# Patient Record
Sex: FEMALE | Race: BLACK OR AFRICAN AMERICAN | NOT HISPANIC OR LATINO | Employment: FULL TIME | URBAN - METROPOLITAN AREA
[De-identification: names, ages, dates, MRNs, and addresses within clinical notes are randomized per-mention and may not be internally consistent; named-entity substitution may affect disease eponyms.]

---

## 2018-01-31 ENCOUNTER — OFFICE VISIT (OUTPATIENT)
Dept: OBSTETRICS AND GYNECOLOGY | Facility: CLINIC | Age: 23
End: 2018-01-31
Payer: MEDICAID

## 2018-01-31 VITALS
DIASTOLIC BLOOD PRESSURE: 70 MMHG | HEIGHT: 64 IN | WEIGHT: 194.88 LBS | BODY MASS INDEX: 33.27 KG/M2 | SYSTOLIC BLOOD PRESSURE: 126 MMHG

## 2018-01-31 DIAGNOSIS — Z30.09 ENCOUNTER FOR COUNSELING REGARDING CONTRACEPTION: Primary | ICD-10-CM

## 2018-01-31 PROCEDURE — 99999 PR PBB SHADOW E&M-EST. PATIENT-LVL II: CPT | Mod: PBBFAC,,, | Performed by: ADVANCED PRACTICE MIDWIFE

## 2018-01-31 PROCEDURE — 3008F BODY MASS INDEX DOCD: CPT | Mod: ,,, | Performed by: ADVANCED PRACTICE MIDWIFE

## 2018-01-31 PROCEDURE — 99203 OFFICE O/P NEW LOW 30 MIN: CPT | Mod: S$PBB,,, | Performed by: ADVANCED PRACTICE MIDWIFE

## 2018-01-31 PROCEDURE — 99212 OFFICE O/P EST SF 10 MIN: CPT | Mod: PBBFAC,PO | Performed by: ADVANCED PRACTICE MIDWIFE

## 2018-01-31 NOTE — PROGRESS NOTES
"Subjective:       Ashleigh Honeycutt is a 22 y.o. female here for an infertility consult. Pt wants to get pregnant because she is lonely. She is not in a relationship or . She reports having a good job, her own home and car. Lots of family support.     Gynecologic History  No LMP recorded (lmp unknown). Patient is not currently having periods (Reason: Birth Control).  Menarche: 17, prior to being on depo, she had regular cycles  Contraception: Depo Started at age 18 after pregnancy (elective ). She was on this for 3 years. Last shot was in October. Never had cycles on the shot.  Sexually active: men. 1 partner in the last year. Using condoms inconsistently.  STD: none  OB: one  (D&C) at age 18    Obstetric History  OB History    Para Term  AB Living   1       1     SAB TAB Ectopic Multiple Live Births                  # Outcome Date GA Lbr Van/2nd Weight Sex Delivery Anes PTL Lv   1 AB                 The following portions of the patient's history were reviewed and updated as appropriate: allergies, current medications, past family history, past medical history, past social history, past surgical history and problem list.    Review of Systems  Constitutional: negative  Cardiovascular: negative  Gastrointestinal: negative  Genitourinary:negative  Integument/breast: negative  Hematologic/lymphatic: negative      Objective:      /70   Ht 5' 4" (1.626 m)   Wt 88.4 kg (194 lb 14.2 oz)   LMP  (LMP Unknown)   BMI 33.45 kg/m²    Talk only (pt declines exam today)  General appearance: alert, appears stated age and cooperative  Lungs: normal respiratory rate  Heart: regular rate  Extremities: extremities normal, atraumatic, no cyanosis or edema      Assessment:      Healthy female exam.      Plan:         Discussed that one of the side effects of Depo is that we are unsure when ovulation will ensue. Discussed mapping cycles once they begin to determine which days she is most " "fertile.  Recommend daily PNV.   Recommended persistent condom use to avoid STD exposure.  Reviewed the realities of pregnancy and  including discomforts, delivery, costs, infant costs, day cares, etc. Pt affirms that she did not yet consider the cons of being young and single with a . She also reports that she watched her sister give birth and it was "scary."     "

## 2019-04-19 ENCOUNTER — HOSPITAL ENCOUNTER (EMERGENCY)
Facility: HOSPITAL | Age: 24
Discharge: HOME | End: 2019-04-19
Attending: EMERGENCY MEDICINE
Payer: MEDICAID

## 2019-04-19 ENCOUNTER — NURSE TRIAGE (OUTPATIENT)
Dept: ADMINISTRATIVE | Facility: CLINIC | Age: 24
End: 2019-04-19

## 2019-04-19 VITALS
RESPIRATION RATE: 18 BRPM | OXYGEN SATURATION: 100 % | BODY MASS INDEX: 36.32 KG/M2 | WEIGHT: 205 LBS | TEMPERATURE: 99 F | DIASTOLIC BLOOD PRESSURE: 70 MMHG | SYSTOLIC BLOOD PRESSURE: 120 MMHG | HEART RATE: 83 BPM | HEIGHT: 63 IN

## 2019-04-19 DIAGNOSIS — O20.0 THREATENED MISCARRIAGE: Primary | ICD-10-CM

## 2019-04-19 LAB
ABO + RH BLD: NORMAL
ALBUMIN SERPL BCP-MCNC: 3.4 G/DL (ref 3.5–5.2)
ALP SERPL-CCNC: 74 U/L (ref 55–135)
ALT SERPL W/O P-5'-P-CCNC: 6 U/L (ref 10–44)
AMORPH CRY URNS QL MICRO: NORMAL
ANION GAP SERPL CALC-SCNC: 10 MMOL/L (ref 8–16)
AST SERPL-CCNC: 15 U/L (ref 10–40)
BACTERIA #/AREA URNS HPF: NORMAL /HPF
BACTERIA GENITAL QL WET PREP: ABNORMAL
BASOPHILS # BLD AUTO: 0.02 K/UL (ref 0–0.2)
BASOPHILS NFR BLD: 0.3 % (ref 0–1.9)
BILIRUB SERPL-MCNC: 0.2 MG/DL (ref 0.1–1)
BILIRUB UR QL STRIP: NEGATIVE
BUN SERPL-MCNC: 7 MG/DL (ref 6–20)
CALCIUM SERPL-MCNC: 9.9 MG/DL (ref 8.7–10.5)
CHLORIDE SERPL-SCNC: 105 MMOL/L (ref 95–110)
CLARITY UR: ABNORMAL
CLUE CELLS VAG QL WET PREP: ABNORMAL
CO2 SERPL-SCNC: 21 MMOL/L (ref 23–29)
COLOR UR: YELLOW
CREAT SERPL-MCNC: 0.7 MG/DL (ref 0.5–1.4)
DIFFERENTIAL METHOD: ABNORMAL
EOSINOPHIL # BLD AUTO: 0.1 K/UL (ref 0–0.5)
EOSINOPHIL NFR BLD: 0.8 % (ref 0–8)
ERYTHROCYTE [DISTWIDTH] IN BLOOD BY AUTOMATED COUNT: 14.1 % (ref 11.5–14.5)
EST. GFR  (AFRICAN AMERICAN): >60 ML/MIN/1.73 M^2
EST. GFR  (NON AFRICAN AMERICAN): >60 ML/MIN/1.73 M^2
FILAMENT FUNGI VAG WET PREP-#/AREA: ABNORMAL
GLUCOSE SERPL-MCNC: 83 MG/DL (ref 70–110)
GLUCOSE UR QL STRIP: NEGATIVE
HCG INTACT+B SERPL-ACNC: NORMAL MIU/ML
HCT VFR BLD AUTO: 35.3 % (ref 37–48.5)
HGB BLD-MCNC: 11.4 G/DL (ref 12–16)
HGB UR QL STRIP: NEGATIVE
HYALINE CASTS #/AREA URNS LPF: 0 /LPF
KETONES UR QL STRIP: NEGATIVE
LEUKOCYTE ESTERASE UR QL STRIP: NEGATIVE
LYMPHOCYTES # BLD AUTO: 1.3 K/UL (ref 1–4.8)
LYMPHOCYTES NFR BLD: 16.6 % (ref 18–48)
MCH RBC QN AUTO: 28.1 PG (ref 27–31)
MCHC RBC AUTO-ENTMCNC: 32.3 G/DL (ref 32–36)
MCV RBC AUTO: 87 FL (ref 82–98)
MICROSCOPIC COMMENT: NORMAL
MONOCYTES # BLD AUTO: 0.5 K/UL (ref 0.3–1)
MONOCYTES NFR BLD: 6.7 % (ref 4–15)
NEUTROPHILS # BLD AUTO: 5.8 K/UL (ref 1.8–7.7)
NEUTROPHILS NFR BLD: 75.6 % (ref 38–73)
NITRITE UR QL STRIP: NEGATIVE
PH UR STRIP: 7 [PH] (ref 5–8)
PLATELET # BLD AUTO: 320 K/UL (ref 150–350)
PMV BLD AUTO: 9.3 FL (ref 9.2–12.9)
POTASSIUM SERPL-SCNC: 4.2 MMOL/L (ref 3.5–5.1)
PROT SERPL-MCNC: 7.5 G/DL (ref 6–8.4)
PROT UR QL STRIP: ABNORMAL
RBC # BLD AUTO: 4.05 M/UL (ref 4–5.4)
RBC #/AREA URNS HPF: 1 /HPF (ref 0–4)
SODIUM SERPL-SCNC: 136 MMOL/L (ref 136–145)
SP GR UR STRIP: 1.02 (ref 1–1.03)
SPECIMEN SOURCE: ABNORMAL
SQUAMOUS #/AREA URNS HPF: 7 /HPF
T VAGINALIS GENITAL QL WET PREP: ABNORMAL
URN SPEC COLLECT METH UR: ABNORMAL
UROBILINOGEN UR STRIP-ACNC: NEGATIVE EU/DL
WBC # BLD AUTO: 7.71 K/UL (ref 3.9–12.7)
WBC #/AREA URNS HPF: 1 /HPF (ref 0–5)
WBC #/AREA VAG WET PREP: ABNORMAL
YEAST GENITAL QL WET PREP: ABNORMAL

## 2019-04-19 PROCEDURE — 81000 URINALYSIS NONAUTO W/SCOPE: CPT

## 2019-04-19 PROCEDURE — 99284 EMERGENCY DEPT VISIT MOD MDM: CPT | Mod: 25

## 2019-04-19 PROCEDURE — 87491 CHLMYD TRACH DNA AMP PROBE: CPT

## 2019-04-19 PROCEDURE — 84702 CHORIONIC GONADOTROPIN TEST: CPT

## 2019-04-19 PROCEDURE — 86901 BLOOD TYPING SEROLOGIC RH(D): CPT

## 2019-04-19 PROCEDURE — 80053 COMPREHEN METABOLIC PANEL: CPT

## 2019-04-19 PROCEDURE — 85025 COMPLETE CBC W/AUTO DIFF WBC: CPT

## 2019-04-19 PROCEDURE — 87210 SMEAR WET MOUNT SALINE/INK: CPT

## 2019-04-19 NOTE — TELEPHONE ENCOUNTER
Reason for Disposition   Health Information question, no triage required and triager able to answer question    Protocols used: INFORMATION ONLY CALL-A-AH    Patient seen at [facility] for UTI but medication is not working. She is 3 months pregnant and still have the same symptoms. She is nauseated and having diarrhea. Patient wants to know if she could come to [facility] and she was informed that yes she could come to the facility and she will go to the [facility] location.

## 2019-04-19 NOTE — ED TRIAGE NOTES
The pt states she is pregnant. Was seen by the doctor and diagnosed with a UTI and is taking antibiotics. Denies fever, burning upon urination, vaginal discharge. Now she is having right sided abdominal pain that started on last Sunday.

## 2019-04-19 NOTE — ED PROVIDER NOTES
Encounter Date: 2019       History     Chief Complaint   Patient presents with    Urinary Tract Infection     Pt seen 2 weeks ago and diagnosed with UTI. She reports she was unable to complete full dose of antibiotics but its was making her have diarrhea, dry mouth and decreased appetite. she reports continued dysuria and urinary frequency. pt states she is 9 weeks pregnant     Chief Complaint:  Malodorous urine/abdominal pain  History of  Present Illness: History obtained from patient. This 23 y.o. female A0 who is 9 week 4 days gestation by LMP   (19) has past medical history of anemia presents to the ED complaining of right-sided pelvic pain intermittently for 1 week.  She also complains of malodorous urine for approximately 2 weeks.  She states she was seen at [facility] emergency Department  upon initial onset of malodorous urine and was diagnosed with a UTI.  She states she was discharged home with Macrobid but did not complete the antibiotic course secondary to side effects including nausea, diarrhea, decreased appetite and dry mouth.  She states she discontinued treat with antibiotics approximately 2 days ago.  Instead of taking the antibiotics as prescribed, she states she was only taking the medication once per day to reduce side effects.  She denies vaginal bleeding, vaginal discharge, dysuria, hematuria, urinary frequency, vomiting, fever, chills, chest pain, shortness of breath.        Review of patient's allergies indicates:  No Known Allergies  Past Medical History:   Diagnosis Date    Anemia      History reviewed. No pertinent surgical history.  Family History   Problem Relation Age of Onset    Diabetes Father     Diabetes Mother      Social History     Tobacco Use    Smoking status: Never Smoker    Smokeless tobacco: Never Used   Substance Use Topics    Alcohol use: No    Drug use: No     Review of Systems   Constitutional: Negative for chills and fever.   HENT: Negative for  congestion, rhinorrhea and sore throat.    Eyes: Negative for visual disturbance.   Respiratory: Negative for cough and shortness of breath.    Cardiovascular: Negative for chest pain.   Gastrointestinal: Positive for abdominal pain, diarrhea and nausea. Negative for vomiting.   Genitourinary: Negative for dysuria, frequency, hematuria, vaginal bleeding and vaginal discharge.        (+) malodorous urine   Musculoskeletal: Negative for back pain.   Skin: Negative for rash.   Neurological: Negative for dizziness, weakness and headaches.       Physical Exam     Initial Vitals [04/19/19 1404]   BP Pulse Resp Temp SpO2   (!) 119/56 84 16 99.4 °F (37.4 °C) 99 %      MAP       --         Physical Exam    Nursing note and vitals reviewed.  Constitutional: She appears well-developed and well-nourished. No distress.   HENT:   Head: Normocephalic and atraumatic.   Eyes: EOM are normal. Pupils are equal, round, and reactive to light.   Neck: Normal range of motion. Neck supple.   Cardiovascular: Normal rate, regular rhythm and normal heart sounds. Exam reveals no gallop and no friction rub.    No murmur heard.  Pulmonary/Chest: Breath sounds normal. No respiratory distress. She has no wheezes. She has no rhonchi. She has no rales.   Abdominal: Soft. Bowel sounds are normal. There is no tenderness. There is no rebound and no guarding.   Genitourinary: Pelvic exam was performed with patient supine. There is no rash, tenderness or lesion on the right labia. There is no rash, tenderness or lesion on the left labia. Cervix exhibits no motion tenderness, no discharge and no friability. Right adnexum displays no mass, no tenderness and no fullness. Left adnexum displays no mass, no tenderness and no fullness. No bleeding in the vagina. Vaginal discharge found.   Genitourinary Comments: Crystal Leger RN at bedside for pelvic examination.  Cervical os is closed.  There is a thin white discharge in the vaginal vault.    Musculoskeletal: Normal range of motion.   Neurological: She is alert and oriented to person, place, and time.   Skin: Skin is warm and dry.   Psychiatric: She has a normal mood and affect.         ED Course   Procedures  Labs Reviewed   URINALYSIS, REFLEX TO URINE CULTURE - Abnormal; Notable for the following components:       Result Value    Appearance, UA Hazy (*)     Protein, UA 1+ (*)     All other components within normal limits    Narrative:     Preferred Collection Type->Urine, Clean Catch   CBC W/ AUTO DIFFERENTIAL - Abnormal; Notable for the following components:    Hemoglobin 11.4 (*)     Hematocrit 35.3 (*)     Gran% 75.6 (*)     Lymph% 16.6 (*)     All other components within normal limits   COMPREHENSIVE METABOLIC PANEL - Abnormal; Notable for the following components:    CO2 21 (*)     Albumin 3.4 (*)     ALT 6 (*)     All other components within normal limits   VAGINAL SCREEN - Abnormal; Notable for the following components:    WBC - Vaginal Screen Rare (*)     Bacteria - Vaginal Screen Few (*)     All other components within normal limits   C. TRACHOMATIS/N. GONORRHOEAE BY AMP DNA   HCG, QUANTITATIVE, PREGNANCY   URINALYSIS MICROSCOPIC    Narrative:     Preferred Collection Type->Urine, Clean Catch   POCT URINE PREGNANCY   GROUP & RH          Imaging Results          US OB Less Than 14 Wks with Transvag(xpd (Final result)  Result time 04/19/19 16:15:11    Final result by Alek Christina MD (04/19/19 16:15:11)                 Impression:      Single live intrauterine pregnancy with estimated gestational age 9 weeks 4 days.      Electronically signed by: Alek Christina MD  Date:    04/19/2019  Time:    16:15             Narrative:    EXAMINATION:  US OB LESS THAN 14 WKS WITH TRANSVAGINAL (XPD)    CLINICAL HISTORY:  Vag Bleeding;    TECHNIQUE:  Transabdominal sonography of the pelvis was performed, followed by transvaginal sonography to better evaluate the uterus and  ovaries.    COMPARISON:  None    FINDINGS:  Gestation: Intrauterine gestation is present with measurements as follows:    *Mean gestational sac diameter: 3.3 cm  *Yolk sac: 4 mm  *Crown-rump length: 26.6 mm  *Cardiac activity: 170 BPM  .    Uterus: No myometrial mass.    Right ovary: 1.8 cm corpus luteum cyst.    Left ovary: Normal.    Miscellaneous: No Free Fluid.                                 Medical Decision Making:   Clinical Tests:   Lab Tests: Ordered and Reviewed  Radiological Study: Ordered and Reviewed  ED Management:  Patient presents with right sided pelvic pain and malodorous urine.  Patient is UPT positive. The patient's beta-hCG is 122,187  Pelvic examination reveals that the cervix is closed.  There was no cervical motion tenderness or adnexal tenderness.  There was no discharge or active signs of bleeding.  I doubt tubo-ovarian abscess, PID, cervicitis, or vaginitis.  The patient's blood type is O positive.   The patient does not require RhoGAM at this time.  Transvaginal ultrasound shows single live IUP with estimated gestational age of 9 weeks 4 days with fetal heart rate of 170.  No free fluid.  There is a 1.8 cm right corpus luteum cyst.    UA shows no signs of infection.    Patient is diagnosed with threatened miscarriage.  The results and physical exam findings were reviewed with the patient. Pt agrees with assessment, disposition and treatment plan and has no further questions or complaints at this time. The patient will need to follow up with her OB/GYN as soon as possible. I have given her strict return precautions. The patient should continue taking prenatal vitamins.  I discussed the need to have pelvic rest, avoiding heavy lifting and abstaining from sexual intercourse.    I discussed the case with Dr. Nava who is in agreement with my assessment and plan.                        Clinical Impression:       ICD-10-CM ICD-9-CM   1. Threatened miscarriage O20.0 640.00                                 Fede Gross PA-C  04/19/19 1818

## 2019-04-23 LAB
C TRACH DNA SPEC QL NAA+PROBE: NOT DETECTED
N GONORRHOEA DNA SPEC QL NAA+PROBE: NOT DETECTED

## 2021-04-26 ENCOUNTER — PATIENT MESSAGE (OUTPATIENT)
Dept: RESEARCH | Facility: HOSPITAL | Age: 26
End: 2021-04-26

## 2021-07-15 ENCOUNTER — LAB VISIT (OUTPATIENT)
Dept: LAB | Facility: OTHER | Age: 26
End: 2021-07-15
Payer: MEDICAID

## 2021-07-15 DIAGNOSIS — Z20.822 ENCOUNTER FOR LABORATORY TESTING FOR COVID-19 VIRUS: ICD-10-CM

## 2021-07-15 PROCEDURE — U0003 INFECTIOUS AGENT DETECTION BY NUCLEIC ACID (DNA OR RNA); SEVERE ACUTE RESPIRATORY SYNDROME CORONAVIRUS 2 (SARS-COV-2) (CORONAVIRUS DISEASE [COVID-19]), AMPLIFIED PROBE TECHNIQUE, MAKING USE OF HIGH THROUGHPUT TECHNOLOGIES AS DESCRIBED BY CMS-2020-01-R: HCPCS | Performed by: NURSE PRACTITIONER

## 2021-07-16 LAB
SARS-COV-2 RNA RESP QL NAA+PROBE: NOT DETECTED
SARS-COV-2- CYCLE NUMBER: -1

## 2021-07-29 ENCOUNTER — LAB VISIT (OUTPATIENT)
Dept: LAB | Facility: OTHER | Age: 26
End: 2021-07-29
Payer: MEDICAID

## 2021-07-29 DIAGNOSIS — Z20.822 ENCOUNTER FOR LABORATORY TESTING FOR COVID-19 VIRUS: ICD-10-CM

## 2021-07-29 PROCEDURE — U0003 INFECTIOUS AGENT DETECTION BY NUCLEIC ACID (DNA OR RNA); SEVERE ACUTE RESPIRATORY SYNDROME CORONAVIRUS 2 (SARS-COV-2) (CORONAVIRUS DISEASE [COVID-19]), AMPLIFIED PROBE TECHNIQUE, MAKING USE OF HIGH THROUGHPUT TECHNOLOGIES AS DESCRIBED BY CMS-2020-01-R: HCPCS | Performed by: NURSE PRACTITIONER

## 2021-07-30 LAB
SARS-COV-2 RNA RESP QL NAA+PROBE: NOT DETECTED
SARS-COV-2- CYCLE NUMBER: -1

## 2023-07-05 ENCOUNTER — PATIENT MESSAGE (OUTPATIENT)
Dept: RESEARCH | Facility: HOSPITAL | Age: 28
End: 2023-07-05
Payer: MEDICAID

## 2024-12-30 ENCOUNTER — OFFICE VISIT (OUTPATIENT)
Dept: URGENT CARE | Facility: CLINIC | Age: 29
End: 2024-12-30
Payer: COMMERCIAL

## 2024-12-30 VITALS
BODY MASS INDEX: 39.6 KG/M2 | TEMPERATURE: 99 F | DIASTOLIC BLOOD PRESSURE: 77 MMHG | OXYGEN SATURATION: 100 % | SYSTOLIC BLOOD PRESSURE: 127 MMHG | RESPIRATION RATE: 18 BRPM | HEART RATE: 79 BPM | HEIGHT: 64 IN | WEIGHT: 231.94 LBS

## 2024-12-30 DIAGNOSIS — R05.1 ACUTE COUGH: ICD-10-CM

## 2024-12-30 DIAGNOSIS — J02.9 VIRAL PHARYNGITIS: Primary | ICD-10-CM

## 2024-12-30 DIAGNOSIS — J02.9 SORE THROAT: ICD-10-CM

## 2024-12-30 LAB
CTP QC/QA: YES
CTP QC/QA: YES
MOLECULAR STREP A: NEGATIVE
SARS-COV-2 AG RESP QL IA.RAPID: NEGATIVE

## 2024-12-30 PROCEDURE — 87651 STREP A DNA AMP PROBE: CPT | Mod: QW,S$GLB,, | Performed by: NURSE PRACTITIONER

## 2024-12-30 RX ORDER — CHLORHEXIDINE GLUCONATE ORAL RINSE 1.2 MG/ML
SOLUTION DENTAL
COMMUNITY
Start: 2024-09-30

## 2024-12-30 RX ORDER — PRAZOSIN HYDROCHLORIDE 1 MG/1
CAPSULE ORAL
COMMUNITY
Start: 2024-02-22

## 2024-12-30 RX ORDER — PREDNISONE 10 MG/1
TABLET ORAL
COMMUNITY

## 2024-12-30 RX ORDER — SEMAGLUTIDE 0.68 MG/ML
INJECTION, SOLUTION SUBCUTANEOUS
COMMUNITY

## 2024-12-30 RX ORDER — FLUTICASONE PROPIONATE 110 UG/1
AEROSOL, METERED RESPIRATORY (INHALATION)
COMMUNITY

## 2024-12-30 RX ORDER — MELOXICAM 15 MG/1
TABLET ORAL
COMMUNITY

## 2024-12-30 RX ORDER — MEDROXYPROGESTERONE ACETATE 104 MG/.65ML
INJECTION, SUSPENSION SUBCUTANEOUS
COMMUNITY
Start: 2022-05-19

## 2024-12-30 RX ORDER — IPRATROPIUM BROMIDE 21 UG/1
2 SPRAY, METERED NASAL 2 TIMES DAILY
Qty: 30 ML | Refills: 0 | Status: SHIPPED | OUTPATIENT
Start: 2024-12-30 | End: 2025-01-06

## 2024-12-30 RX ORDER — GUAIFENESIN 600 MG/1
1200 TABLET, EXTENDED RELEASE ORAL 2 TIMES DAILY
Qty: 40 TABLET | Refills: 0 | Status: SHIPPED | OUTPATIENT
Start: 2024-12-30 | End: 2025-01-09

## 2024-12-30 RX ORDER — PEN NEEDLE, DIABETIC, SAFETY 30 GX3/16"
NEEDLE, DISPOSABLE MISCELLANEOUS
COMMUNITY

## 2024-12-30 RX ORDER — KETOROLAC TROMETHAMINE 30 MG/ML
30 INJECTION, SOLUTION INTRAMUSCULAR; INTRAVENOUS
Status: COMPLETED | OUTPATIENT
Start: 2024-12-30 | End: 2024-12-30

## 2024-12-30 RX ORDER — BENZONATATE 100 MG/1
200 CAPSULE ORAL 3 TIMES DAILY PRN
Qty: 60 CAPSULE | Refills: 0 | Status: SHIPPED | OUTPATIENT
Start: 2024-12-30 | End: 2025-01-09

## 2024-12-30 RX ORDER — FLUOXETINE 10 MG/1
1 CAPSULE ORAL
COMMUNITY
Start: 2024-02-22

## 2024-12-30 RX ORDER — AMOXICILLIN AND CLAVULANATE POTASSIUM 875; 125 MG/1; MG/1
1 TABLET, FILM COATED ORAL 2 TIMES DAILY
COMMUNITY
Start: 2024-09-30

## 2024-12-30 RX ORDER — DOXYCYCLINE 100 MG/1
100 CAPSULE ORAL
COMMUNITY
Start: 2024-11-06

## 2024-12-30 RX ORDER — PROMETHAZINE HYDROCHLORIDE AND DEXTROMETHORPHAN HYDROBROMIDE 6.25; 15 MG/5ML; MG/5ML
5 SYRUP ORAL EVERY 8 HOURS PRN
Qty: 150 ML | Refills: 0 | Status: SHIPPED | OUTPATIENT
Start: 2024-12-30 | End: 2025-01-09

## 2024-12-30 RX ORDER — PHENTERMINE HYDROCHLORIDE 37.5 MG/1
TABLET ORAL
COMMUNITY

## 2024-12-30 RX ORDER — IBUPROFEN 800 MG/1
800 TABLET ORAL EVERY 8 HOURS PRN
COMMUNITY
Start: 2024-11-29

## 2024-12-30 RX ORDER — METFORMIN HYDROCHLORIDE 500 MG/1
TABLET, EXTENDED RELEASE ORAL
COMMUNITY

## 2024-12-30 RX ORDER — CLINDAMYCIN PHOSPHATE 11.9 MG/ML
SOLUTION TOPICAL 2 TIMES DAILY
COMMUNITY
Start: 2024-11-06

## 2024-12-30 RX ADMIN — KETOROLAC TROMETHAMINE 30 MG: 30 INJECTION, SOLUTION INTRAMUSCULAR; INTRAVENOUS at 10:12

## 2024-12-30 NOTE — PATIENT INSTRUCTIONS
Discharge instructions for Viral Pharyngitis and Cough    - Rest.    - Drink plenty of fluids.  - Viral upper respiratory infections typically run their course in 10-14 days.     - Tylenol (acetaminophen) or Ibuprofen as directed as needed for fever/pain. Avoid tylenol if you have a history of liver disease. Do not take ibuprofen if you have a history of GI bleeding, kidney disease, gastric surgery, or if you take blood thinners.     - You can take over-the-counter claritin, zyrtec, allegra, or xyzal as directed. These are antihistamines that can help with runny nose, nasal congestion, sneezing, and helps to dry up post-nasal drip, which usually causes sore throat and cough.   - If you do NOT have high blood pressure, you may use a decongestant form (D)  of this medication (ie. Claritin- D, zyrtec-D, allegra-D) or if you do not take the D form, you can take sudafed (pseudoephedrine) over the counter, which is a decongestant. Do NOT take two decongestant (D) medications at the same time (such as mucinex-D and claritin-D or plain sudafed and claritin D)    - You can use Flonase (fluticasone) nasal spray as directed for sinus congestion and postnasal drip. This is a steroid nasal spray that works locally over time to decrease the inflammation in your nose/sinuses and help with allergic symptoms. This is not an quick- relief spray like afrin, but it works well if used daily.  Discontinue if you develop nose bleed  - use nasal saline prior to Flonase.  - Use Ocean Spray Nasal Saline 1-3 puffs each nostril every 2-3 hours then blow out onto tissue. This is to irrigate the nasal passage way to clear the sinus openings. Use until sinus problem resolved.    - you can take plain Mucinex (guaifenesin) 1200 mg twice a day to help loosen mucous.     -warm salt water gargles can help with sore throat    - warm tea with honey can help with cough. Honey is a natural cough suppressant.    - Dextromethorphan (DM) is a cough  suppressant over the counter (ie. mucinex DM, robitussin, delsym; dayquil/nyquil has DM as well.)    - Follow up with your PCP or specialty clinic as directed in the next 1-2 weeks if not improved or as needed.  You can call [phone] to schedule an appointment with the appropriate provider.      - Go to the ER if you develop new or worsening symptoms.     - You must understand that you have received an Urgent Care treatment only and that you may be released before all of your medical problems are known or treated.   - You, the patient, will arrange for follow up care as instructed.   - If your condition worsens or fails to improve we recommend that you receive another evaluation at the ER immediately or contact your PCP to discuss your concerns or return here.

## 2024-12-30 NOTE — LETTER
December 30, 2024      [facility] [address] [address]  Phone: [phone]  Fax: [phone]       Patient: Ashleigh Honeycutt   YOB: 1995  Date of Visit: 12/30/2024    To Whom It May Concern:    Cheryl Honeycutt  was at [facility] on 12/30/2024. The patient may return to work/school on 01/03/2025 with no restrictions. If you have any questions or concerns, or if I can be of further assistance, please do not hesitate to contact me.    Sincerely,    Giovanna Hanson, DNP

## 2024-12-30 NOTE — PROGRESS NOTES
"Subjective:      Patient ID: Ashleigh Honeycutt is a 29 y.o. female.    Vitals:  height is 5' 4" (1.626 m) and weight is 105.2 kg (231 lb 14.8 oz). Her oral temperature is 99.1 °F (37.3 °C). Her blood pressure is 127/77 and her pulse is 79. Her respiration is 18 and oxygen saturation is 100%.     Chief Complaint: Sore Throat    Pt presents today w/ sore throat accompanied w/ productive cough (unknown color ) and nasal congestion ; onset approx 4x days ago. Pt c/o sneezing , hoarse voice  , lt ear fullness, swollen glands and trouble swallowing. Pt tried gargling with mouthwash ; no relief.     Sore Throat   This is a new problem. The problem has been unchanged. The pain is at a severity of 9/10. The pain is moderate. Associated symptoms include congestion, coughing, a hoarse voice, a plugged ear sensation, swollen glands and trouble swallowing. Pertinent negatives include no abdominal pain, diarrhea, drooling, ear discharge, ear pain, headaches, neck pain, shortness of breath, stridor or vomiting. She has had no exposure to strep or mono. Treatments tried: mouthwash. The treatment provided no relief.       HENT:  Positive for congestion, sore throat and trouble swallowing. Negative for ear pain, ear discharge and drooling.    Neck: Negative for neck pain.   Respiratory:  Positive for cough. Negative for shortness of breath and stridor.    Gastrointestinal:  Negative for abdominal pain, vomiting and diarrhea.   Neurological:  Negative for headaches.      Objective:     Physical Exam   Constitutional: She is oriented to person, place, and time. She appears well-developed. She is cooperative.  Non-toxic appearance. She does not appear ill. No distress. awake  HENT:   Head: Normocephalic and atraumatic.   Ears:   Right Ear: Hearing, tympanic membrane, external ear and ear canal normal.   Left Ear: Hearing, tympanic membrane, external ear and ear canal normal.   Nose: Mucosal edema and congestion present. No rhinorrhea or " nasal deformity. No epistaxis. Right sinus exhibits no maxillary sinus tenderness and no frontal sinus tenderness. Left sinus exhibits no maxillary sinus tenderness and no frontal sinus tenderness.   Mouth/Throat: Uvula is midline and mucous membranes are normal. No trismus in the jaw. Normal dentition. No uvula swelling. Posterior oropharyngeal erythema and cobblestoning present. No oropharyngeal exudate, posterior oropharyngeal edema or tonsillar abscesses.   Eyes: Conjunctivae and lids are normal. No scleral icterus.   Neck: Trachea normal and phonation normal. Neck supple. No thyromegaly present. No edema present. No erythema present. No neck rigidity present.   Cardiovascular: Normal rate, regular rhythm, S1 normal, S2 normal, normal heart sounds and normal pulses.   Pulmonary/Chest: Effort normal and breath sounds normal. No respiratory distress. She has no decreased breath sounds. She has no wheezes. She has no rhonchi. She has no rales.   Abdominal: Normal appearance and bowel sounds are normal. Soft. protuberant   Musculoskeletal: Normal range of motion.         General: No deformity. Normal range of motion.      Right lower leg: No edema.      Left lower leg: No edema.   Lymphadenopathy:     She has no cervical adenopathy.   Neurological: no focal deficit. She is alert and oriented to person, place, and time. She exhibits normal muscle tone. Coordination normal.   Skin: Skin is warm, dry, intact, not diaphoretic and not pale. Capillary refill takes less than 2 seconds.   Psychiatric: Her speech is normal and behavior is normal. Mood, judgment and thought content normal.   Nursing note and vitals reviewed.        Results for orders placed or performed in visit on 12/30/24   POCT Strep A, Molecular    Collection Time: 12/30/24  9:43 AM   Result Value Ref Range    Molecular Strep A, POC Negative Negative     Acceptable Yes    SARS Coronavirus 2 Antigen, POCT Manual Read    Collection Time:  12/30/24  9:43 AM   Result Value Ref Range    SARS Coronavirus 2 Antigen Negative Negative     Acceptable Yes        Assessment:     1. Viral pharyngitis    2. Sore throat    3. Acute cough        Plan:       Viral pharyngitis  -     guaiFENesin (MUCINEX) 600 mg 12 hr tablet; Take 2 tablets (1,200 mg total) by mouth 2 (two) times daily. for 10 days  Dispense: 40 tablet; Refill: 0  -     ipratropium (ATROVENT) 21 mcg (0.03 %) nasal spray; 2 sprays by Each Nostril route 2 (two) times daily. for 7 days  Dispense: 30 mL; Refill: 0    Sore throat  -     POCT Strep A, Molecular  -     SARS Coronavirus 2 Antigen, POCT Manual Read  -     ketorolac injection 30 mg  -     diphenhydrAMINE-aluminum-magnesium hydroxide-simethicone-LIDOcaine viscous HCl 2%; Swish and spit 15 mLs every 6 (six) hours as needed (sore throat).  Dispense: 300 each; Refill: 0    Acute cough  -     benzonatate (TESSALON) 100 MG capsule; Take 2 capsules (200 mg total) by mouth 3 (three) times daily as needed for Cough.  Dispense: 60 capsule; Refill: 0  -     promethazine-dextromethorphan (PROMETHAZINE-DM) 6.25-15 mg/5 mL Syrp; Take 5 mLs by mouth every 8 (eight) hours as needed (cough).  Dispense: 150 mL; Refill: 0             Patient Instructions   Discharge instructions for Viral Pharyngitis and Cough    - Rest.    - Drink plenty of fluids.  - Viral upper respiratory infections typically run their course in 10-14 days.     - Tylenol (acetaminophen) or Ibuprofen as directed as needed for fever/pain. Avoid tylenol if you have a history of liver disease. Do not take ibuprofen if you have a history of GI bleeding, kidney disease, gastric surgery, or if you take blood thinners.     - You can take over-the-counter claritin, zyrtec, allegra, or xyzal as directed. These are antihistamines that can help with runny nose, nasal congestion, sneezing, and helps to dry up post-nasal drip, which usually causes sore throat and cough.   - If you do NOT  have high blood pressure, you may use a decongestant form (D)  of this medication (ie. Claritin- D, zyrtec-D, allegra-D) or if you do not take the D form, you can take sudafed (pseudoephedrine) over the counter, which is a decongestant. Do NOT take two decongestant (D) medications at the same time (such as mucinex-D and claritin-D or plain sudafed and claritin D)    - You can use Flonase (fluticasone) nasal spray as directed for sinus congestion and postnasal drip. This is a steroid nasal spray that works locally over time to decrease the inflammation in your nose/sinuses and help with allergic symptoms. This is not an quick- relief spray like afrin, but it works well if used daily.  Discontinue if you develop nose bleed  - use nasal saline prior to Flonase.  - Use Ocean Spray Nasal Saline 1-3 puffs each nostril every 2-3 hours then blow out onto tissue. This is to irrigate the nasal passage way to clear the sinus openings. Use until sinus problem resolved.    - you can take plain Mucinex (guaifenesin) 1200 mg twice a day to help loosen mucous.     -warm salt water gargles can help with sore throat    - warm tea with honey can help with cough. Honey is a natural cough suppressant.    - Dextromethorphan (DM) is a cough suppressant over the counter (ie. mucinex DM, robitussin, delsym; dayquil/nyquil has DM as well.)    - Follow up with your PCP or specialty clinic as directed in the next 1-2 weeks if not improved or as needed.  You can call [phone] to schedule an appointment with the appropriate provider.      - Go to the ER if you develop new or worsening symptoms.     - You must understand that you have received an Urgent Care treatment only and that you may be released before all of your medical problems are known or treated.   - You, the patient, will arrange for follow up care as instructed.   - If your condition worsens or fails to improve we recommend that you receive another evaluation at the ER  immediately or contact your PCP to discuss your concerns or return here.